# Patient Record
Sex: FEMALE | Race: OTHER | HISPANIC OR LATINO
[De-identification: names, ages, dates, MRNs, and addresses within clinical notes are randomized per-mention and may not be internally consistent; named-entity substitution may affect disease eponyms.]

---

## 2017-08-25 PROBLEM — Z00.00 ENCOUNTER FOR PREVENTIVE HEALTH EXAMINATION: Noted: 2017-08-25

## 2017-08-30 ENCOUNTER — ASOB RESULT (OUTPATIENT)
Age: 37
End: 2017-08-30

## 2017-08-30 ENCOUNTER — APPOINTMENT (OUTPATIENT)
Dept: ANTEPARTUM | Facility: CLINIC | Age: 37
End: 2017-08-30
Payer: MEDICAID

## 2017-08-30 PROCEDURE — 76857 US EXAM PELVIC LIMITED: CPT

## 2017-08-30 PROCEDURE — 76830 TRANSVAGINAL US NON-OB: CPT

## 2017-11-18 ENCOUNTER — EMERGENCY (EMERGENCY)
Facility: HOSPITAL | Age: 37
LOS: 1 days | Discharge: HOME | End: 2017-11-18
Attending: EMERGENCY MEDICINE
Payer: COMMERCIAL

## 2017-11-18 VITALS
HEART RATE: 84 BPM | RESPIRATION RATE: 18 BRPM | SYSTOLIC BLOOD PRESSURE: 110 MMHG | TEMPERATURE: 98 F | OXYGEN SATURATION: 99 % | HEIGHT: 60 IN | WEIGHT: 108.03 LBS | DIASTOLIC BLOOD PRESSURE: 71 MMHG

## 2017-11-18 PROCEDURE — 99283 EMERGENCY DEPT VISIT LOW MDM: CPT | Mod: 25

## 2017-11-18 PROCEDURE — 90715 TDAP VACCINE 7 YRS/> IM: CPT

## 2017-11-18 PROCEDURE — 90471 IMMUNIZATION ADMIN: CPT

## 2017-11-18 PROCEDURE — 12001 RPR S/N/AX/GEN/TRNK 2.5CM/<: CPT

## 2017-11-18 RX ORDER — IBUPROFEN 200 MG
600 TABLET ORAL ONCE
Qty: 0 | Refills: 0 | Status: COMPLETED | OUTPATIENT
Start: 2017-11-18 | End: 2017-11-18

## 2017-11-18 RX ORDER — TETANUS TOXOID, REDUCED DIPHTHERIA TOXOID AND ACELLULAR PERTUSSIS VACCINE, ADSORBED 5; 2.5; 8; 8; 2.5 [IU]/.5ML; [IU]/.5ML; UG/.5ML; UG/.5ML; UG/.5ML
0.5 SUSPENSION INTRAMUSCULAR ONCE
Qty: 0 | Refills: 0 | Status: COMPLETED | OUTPATIENT
Start: 2017-11-18 | End: 2017-11-18

## 2017-11-18 RX ORDER — CEPHALEXIN 500 MG
1 CAPSULE ORAL
Qty: 12 | Refills: 0 | OUTPATIENT
Start: 2017-11-18 | End: 2017-11-21

## 2017-11-18 RX ADMIN — Medication 600 MILLIGRAM(S): at 10:41

## 2017-11-18 RX ADMIN — TETANUS TOXOID, REDUCED DIPHTHERIA TOXOID AND ACELLULAR PERTUSSIS VACCINE, ADSORBED 0.5 MILLILITER(S): 5; 2.5; 8; 8; 2.5 SUSPENSION INTRAMUSCULAR at 10:41

## 2017-11-18 NOTE — ED STATDOCS - ATTENDING CONTRIBUTION TO CARE
I, Mile Moore, performed the initial face to face bedside interview with this patient regarding history of present illness, review of symptoms and relevant past medical, social and family history.  I completed an independent physical examination.  I was the initial provider who evaluated this patient. I have signed out the follow up of any pending tests (i.e. labs, radiological studies) to the ACP.  I have communicated the patient’s plan of care and disposition with the ACP.

## 2017-11-18 NOTE — ED STATDOCS - PRINCIPAL DIAGNOSIS
Laceration of ring finger without foreign body without damage to nail, unspecified laterality, initial encounter

## 2017-11-18 NOTE — ED STATDOCS - OBJECTIVE STATEMENT
36 y/o F presents to the ED c/o laceration to L hand 4th digit today. She states she was cooking today when she accidently cut her finger with her knife. She reports pain at the area as well. Pt's Tetanus is not UTD. Denies fever, chills. No further complaints at this time. NKDA.

## 2017-11-18 NOTE — ED STATDOCS - CARE PLAN
Principal Discharge DX:	Laceration of ring finger without foreign body without damage to nail, unspecified laterality, initial encounter

## 2017-11-18 NOTE — ED STATDOCS - PROGRESS NOTE DETAILS
36 yo with L 4th digit laceration just distal to PIP on ventral aspect. Tetanus updated. Will repair laceration. Laceration repaired. Tetanus updated. Return precautions discussed with patient. Rx keflex 3 days (laceration overlying joint).

## 2017-11-18 NOTE — ED STATDOCS - NS ED MD DISPO DISCHARGE
Nutrition Care Plan    Nutrition Diagnosis:    Food-and-nutrition-related knowledge deficit: related to low sodium diet as evidenced by patient reports an inadequate level of understanding and patient consuming foods high in sodium such as table salt.    Intervention:    Purpose of the nutrition education: Provided nutrition education focused on 2 gm sodium diet, sources of sodium, meal preparation, label reading and eating away from home. Emphasis on sodium-free spice alternatives patient can use instead of salt.  Time spent with patient:  15 minutes    Monitoring and Evaluation:   Area(s) and level of knowledge: Patient verbalized a basic level of understanding and willingness to make modifications.  Contact information provided for further questions/concerns.     Home

## 2019-08-05 ENCOUNTER — EMERGENCY (EMERGENCY)
Facility: HOSPITAL | Age: 39
LOS: 1 days | Discharge: HOME | End: 2019-08-05
Attending: EMERGENCY MEDICINE
Payer: COMMERCIAL

## 2019-08-05 VITALS
OXYGEN SATURATION: 98 % | RESPIRATION RATE: 20 BRPM | SYSTOLIC BLOOD PRESSURE: 102 MMHG | HEART RATE: 122 BPM | TEMPERATURE: 102 F | DIASTOLIC BLOOD PRESSURE: 62 MMHG | WEIGHT: 130.07 LBS | HEIGHT: 60 IN

## 2019-08-05 PROCEDURE — 99283 EMERGENCY DEPT VISIT LOW MDM: CPT

## 2019-08-05 RX ORDER — AMOXICILLIN 250 MG/5ML
1 SUSPENSION, RECONSTITUTED, ORAL (ML) ORAL
Qty: 20 | Refills: 0
Start: 2019-08-05 | End: 2019-08-14

## 2019-08-05 RX ORDER — ACETAMINOPHEN 500 MG
650 TABLET ORAL ONCE
Refills: 0 | Status: COMPLETED | OUTPATIENT
Start: 2019-08-05 | End: 2019-08-05

## 2019-08-05 RX ORDER — AMOXICILLIN 250 MG/5ML
875 SUSPENSION, RECONSTITUTED, ORAL (ML) ORAL
Refills: 0 | Status: DISCONTINUED | OUTPATIENT
Start: 2019-08-05 | End: 2019-08-05

## 2019-08-05 RX ORDER — IBUPROFEN 200 MG
600 TABLET ORAL ONCE
Refills: 0 | Status: COMPLETED | OUTPATIENT
Start: 2019-08-05 | End: 2019-08-05

## 2019-08-05 RX ORDER — ONDANSETRON 8 MG/1
4 TABLET, FILM COATED ORAL ONCE
Refills: 0 | Status: COMPLETED | OUTPATIENT
Start: 2019-08-05 | End: 2019-08-05

## 2019-08-05 RX ADMIN — Medication 1 TABLET(S): at 20:33

## 2019-08-05 RX ADMIN — Medication 650 MILLIGRAM(S): at 20:33

## 2019-08-05 RX ADMIN — ONDANSETRON 4 MILLIGRAM(S): 8 TABLET, FILM COATED ORAL at 20:33

## 2019-08-05 RX ADMIN — Medication 600 MILLIGRAM(S): at 20:33

## 2019-08-05 NOTE — ED PROVIDER NOTE - OBJECTIVE STATEMENT
40 y/o F pt presents to ED with son (translating) c/o sore throat, fever, chills, generalized body aches, nausea and vomiting since yesterday. pt took 1 dose of Tylenol with no relief. d denies HA or neck pain. no chest pain or sob. no abd pain. no diarrhea. no urinary f/u/d. no back pain. no motor or sensory deficits. denies illicit drug use. no recent travel. no rash. no other acute issues symptoms or concerns

## 2019-08-05 NOTE — ED ADULT TRIAGE NOTE - CHIEF COMPLAINT QUOTE
Pt A&Ox4 states "I have a fever and throat pain" Patient ambulated into ED c/o throat pain and a fever started yesterday took Tylenol this morning.

## 2019-08-06 PROCEDURE — 99283 EMERGENCY DEPT VISIT LOW MDM: CPT

## 2019-08-06 PROCEDURE — T1013: CPT

## 2019-08-06 RX ORDER — AMOXICILLIN 250 MG/5ML
1 SUSPENSION, RECONSTITUTED, ORAL (ML) ORAL
Qty: 20 | Refills: 0
Start: 2019-08-06 | End: 2019-08-15

## 2021-10-18 NOTE — ED ADULT TRIAGE NOTE - NS ED NURSE DIRECT TO ROOM YN
Patient's , Pipe, called to schedule an appointment to see Dr. Naidu.  Per Dr. Naidu's protocol, he doesn't treat hepatitis.  Patient's  is requesting a name of a doctor that she can see who would be able to treat her.  Please contact him regarding the concern at [phone].  Thank you.   Yes

## 2022-07-27 NOTE — ED STATDOCS - DISCHARGE REVIEW MATERIAL PRESENTED
It was a pleasure taking care of you in our Emergency Department today. We know that when you come to [facility], you are entrusting us with your health, comfort, and safety. Our physicians and nurses honor that trust, and truly appreciate the opportunity to care for you and your loved ones. We also value your feedback. If you receive a survey about your Emergency Department experience today, please fill it out. We care about our patients' feedback, and we listen to what you have to say. Thank you!       Dr. Bertha Harris MD.
.

## 2023-07-12 NOTE — ED ADULT TRIAGE NOTE - PAIN: PRESENCE, MLM
denies pain/discomfort Post-Care Instructions: I reviewed with the patient in detail post-care instructions. Patient is to keep the biopsy site dry overnight, and then apply bacitracin twice daily until healed. Patient may apply hydrogen peroxide soaks to remove any crusting.

## 2025-01-09 ENCOUNTER — NON-APPOINTMENT (OUTPATIENT)
Age: 45
End: 2025-01-09